# Patient Record
Sex: MALE | Race: WHITE | Employment: FULL TIME | URBAN - METROPOLITAN AREA
[De-identification: names, ages, dates, MRNs, and addresses within clinical notes are randomized per-mention and may not be internally consistent; named-entity substitution may affect disease eponyms.]

---

## 2017-04-26 ENCOUNTER — TELEPHONE (OUTPATIENT)
Dept: CARDIOLOGY CLINIC | Age: 52
End: 2017-04-26

## 2017-11-10 RX ORDER — AMOXICILLIN 500 MG/1
CAPSULE ORAL
Qty: 4 CAPSULE | Refills: 11 | Status: SHIPPED | OUTPATIENT
Start: 2017-11-10 | End: 2018-10-26

## 2018-09-27 ENCOUNTER — OFFICE VISIT (OUTPATIENT)
Dept: CARDIOLOGY CLINIC | Age: 53
End: 2018-09-27
Payer: COMMERCIAL

## 2018-09-27 ENCOUNTER — TELEPHONE (OUTPATIENT)
Dept: CARDIOLOGY CLINIC | Age: 53
End: 2018-09-27

## 2018-09-27 VITALS
WEIGHT: 281 LBS | HEART RATE: 80 BPM | DIASTOLIC BLOOD PRESSURE: 72 MMHG | HEIGHT: 74 IN | BODY MASS INDEX: 36.06 KG/M2 | OXYGEN SATURATION: 95 % | SYSTOLIC BLOOD PRESSURE: 124 MMHG

## 2018-09-27 DIAGNOSIS — I10 ESSENTIAL HYPERTENSION: ICD-10-CM

## 2018-09-27 DIAGNOSIS — E11.9 TYPE 2 DIABETES MELLITUS WITHOUT COMPLICATION, WITHOUT LONG-TERM CURRENT USE OF INSULIN (HCC): ICD-10-CM

## 2018-09-27 DIAGNOSIS — Z95.2 S/P AVR (AORTIC VALVE REPLACEMENT): Primary | ICD-10-CM

## 2018-09-27 DIAGNOSIS — E78.2 MIXED HYPERLIPIDEMIA: ICD-10-CM

## 2018-09-27 PROCEDURE — 99214 OFFICE O/P EST MOD 30 MIN: CPT | Performed by: INTERNAL MEDICINE

## 2018-09-27 RX ORDER — ASPIRIN 81 MG/1
81 TABLET ORAL DAILY
Qty: 30 TABLET | Refills: 3
Start: 2018-09-27 | End: 2020-11-18 | Stop reason: ALTCHOICE

## 2018-09-27 RX ORDER — ATORVASTATIN CALCIUM 10 MG/1
10 TABLET, FILM COATED ORAL DAILY
Qty: 30 TABLET | Refills: 5 | Status: SHIPPED | OUTPATIENT
Start: 2018-09-27 | End: 2019-04-11 | Stop reason: SDUPTHER

## 2018-09-27 NOTE — LETTER
INVOKANA 300 MG TABS tablet  10/7/15  Yes Historical Provider, MD   metFORMIN (GLUCOPHAGE) 1000 MG tablet  10/12/15  Yes Historical Provider, MD   ramipril (ALTACE) 5 MG capsule  10/11/15  Yes Historical Provider, MD   tamsulosin (FLOMAX) 0.4 MG capsule  10/11/15  Yes Historical Provider, MD   warfarin (COUMADIN) 5 MG tablet Take 5 mg by mouth Currently 15 mg qd   Yes Historical Provider, MD        Allergies:  Patient has no known allergies. Review of Systems:   · Constitutional: there has been no unanticipated weight loss. There's been no change in energy level, sleep pattern, or activity level. · Eyes: No visual changes or diplopia. No scleral icterus. · ENT: No Headaches, hearing loss or vertigo. No mouth sores or sore throat. · Cardiovascular: Reviewed in HPI  · Respiratory: No cough or wheezing, no sputum production. No hematemesis. · Gastrointestinal: No abdominal pain, appetite loss, blood in stools. No change in bowel or bladder habits. · Genitourinary: No dysuria, trouble voiding, or hematuria. · Musculoskeletal:  No gait disturbance, weakness or joint complaints. · Integumentary: No rash or pruritis. · Neurological: No headache, diplopia, change in muscle strength, numbness or tingling. No change in gait, balance, coordination, mood, affect, memory, mentation, behavior. · Psychiatric: No anxiety, no depression. · Endocrine: No malaise, fatigue or temperature intolerance. No excessive thirst, fluid intake, or urination. No tremor. · Hematologic/Lymphatic: No abnormal bruising or bleeding, blood clots or swollen lymph nodes. · Allergic/Immunologic: No nasal congestion or hives.     Physical Examination:    Vitals:    09/27/18 1012   BP: 124/72   Pulse: 80   SpO2: 95%        Constitutional and General Appearance: NAD   Respiratory:  · Normal excursion and expansion without use of accessory muscles  · Resp Auscultation: Normal breath sounds without dullness  Cardiovascular: · The apical impulses not displaced  · Heart tones are crisp and normal  · Cervical veins are not engorged  · The carotid upstroke is normal in amplitude and contour without delay or bruit  · Normal S1S2, No S3, No Murmur  · Peripheral pulses are symmetrical and full  · There is no clubbing, cyanosis of the extremities. · No edema  · Femoral Arteries: 2+ and equal  · Pedal Pulses: 2+ and equal   Abdomen:  · No masses or tenderness  · Liver/Spleen: No Abnormalities Noted  Neurological/Psychiatric:  · Alert and oriented in all spheres  · Moves all extremities well  · Exhibits normal gait balance and coordination  · No abnormalities of mood, affect, memory, mentation, or behavior are noted    Stress echo 11/2015  Baseline echocardiogram shows normal LV function with an ejection fraction   of 55% with no wall motion abnormalities. Following exercise there was   uniform augmentation of all segments with appropriate hyperdynamic response   to exercise. Assessment:      S/P AVR: 2005 - stable    Type 2 diabetes mellitus without complication: followed by PCP    Essential hypertension : stable     Chronic anticoagulation : on Coumadin, managing at home, INR consistently 2.5 - 3.5. stable        RBBB       PVC       Abnormal EKG    Plan:  Encourage daily exercise and healthy eating (low carb)  Continue current medication regimen  Recommend taking an 81 mg of Asprin daily due to multiple risk factors for CAD. Discussed R/B. Reviewed lipids from 4/2018. Recommend start low dose statin due to multiple risk factors for CAD. Discussed R/B. Follow up with me in 1-2 years      Jewels Blanco. Cherelle Miles M.D., [facility]              If you have questions, please do not hesitate to call me. I look forward to following Kayla Lizama along with you.     Sincerely,        Miguel Chou MD

## 2018-09-27 NOTE — PATIENT INSTRUCTIONS
Plan:  Encourage daily exercise and healthy eating (low carb)  Continue current medication regimen  Recommend taking an 81 mg of Asprin daily  Will review your labs from your PCP and will call you regarding medication  Follow up with me as needed

## 2018-09-27 NOTE — PROGRESS NOTES
[address]   Cardiac Followup    Referring Provider:  Enriqueta Guadarrama     Chief Complaint   Patient presents with    New Patient     last seen 2015    Cardiac Valve Problem        History of Present Illness:  F/U AVR (2005). Today he states that he feels well from a cardiac standpoint. Denies chest pain, shortness of breath, edema, dizziness, palpitations and syncope. States that he is exercising, by walking and riding a bike as well as a recumbent bike without difficulty. He states he has been traveling a lot due to work. He states he was diagnosed with a neurogenic bladder and is having to straight cath himself at times. He checks his PT/INR at home. BP normal at home. Past Medical History:   has a past medical history of Aortic valve disease; Diabetes ([address] Hyperlipidemia; Hypertension; and Neurogenic bladder. Surgical History:   has a past surgical history that includes Aortic valve replacement. Social History:   reports that he has never smoked. He has never used smokeless tobacco. He reports that he drinks alcohol. He reports that he does not use drugs. Family History:  family history includes Heart Disease in his father and mother. Home Medications:  Prior to Admission medications    Medication Sig Start Date End Date Taking? Authorizing Provider   amoxicillin (AMOXIL) 500 MG capsule 4 tablets one hour prior to dental work 11/10/17  Yes Jose Guadalupe Duran MD   INVOKANA 300 MG TABS tablet  10/7/15  Yes Historical Provider, MD   metFORMIN (GLUCOPHAGE) 1000 MG tablet  10/12/15  Yes Historical Provider, MD   ramipril (ALTACE) 5 MG capsule  10/11/15  Yes Historical Provider, MD   tamsulosin (FLOMAX) 0.4 MG capsule  10/11/15  Yes Historical Provider, MD   warfarin (COUMADIN) 5 MG tablet Take 5 mg by mouth Currently 15 mg qd   Yes Historical Provider, MD        Allergies:  Patient has no known allergies.      Review of Systems:   · Constitutional: there has been no unanticipated weight loss. There's been no change in energy level, sleep pattern, or activity level. · Eyes: No visual changes or diplopia. No scleral icterus. · ENT: No Headaches, hearing loss or vertigo. No mouth sores or sore throat. · Cardiovascular: Reviewed in HPI  · Respiratory: No cough or wheezing, no sputum production. No hematemesis. · Gastrointestinal: No abdominal pain, appetite loss, blood in stools. No change in bowel or bladder habits. · Genitourinary: No dysuria, trouble voiding, or hematuria. · Musculoskeletal:  No gait disturbance, weakness or joint complaints. · Integumentary: No rash or pruritis. · Neurological: No headache, diplopia, change in muscle strength, numbness or tingling. No change in gait, balance, coordination, mood, affect, memory, mentation, behavior. · Psychiatric: No anxiety, no depression. · Endocrine: No malaise, fatigue or temperature intolerance. No excessive thirst, fluid intake, or urination. No tremor. · Hematologic/Lymphatic: No abnormal bruising or bleeding, blood clots or swollen lymph nodes. · Allergic/Immunologic: No nasal congestion or hives. Physical Examination:    Vitals:    09/27/18 1012   BP: 124/72   Pulse: 80   SpO2: 95%        Constitutional and General Appearance: NAD   Respiratory:  · Normal excursion and expansion without use of accessory muscles  · Resp Auscultation: Normal breath sounds without dullness  Cardiovascular:  · The apical impulses not displaced  · Heart tones are crisp and normal  · Cervical veins are not engorged  · The carotid upstroke is normal in amplitude and contour without delay or bruit  · Normal S1S2, No S3, No Murmur  · Peripheral pulses are symmetrical and full  · There is no clubbing, cyanosis of the extremities.   · No edema  · Femoral Arteries: 2+ and equal  · Pedal Pulses: 2+ and equal   Abdomen:  · No masses or tenderness  · Liver/Spleen: No Abnormalities Noted  Neurological/Psychiatric:  · Alert and oriented in all

## 2018-10-26 ENCOUNTER — APPOINTMENT (OUTPATIENT)
Dept: CT IMAGING | Age: 53
End: 2018-10-26
Payer: COMMERCIAL

## 2018-10-26 ENCOUNTER — HOSPITAL ENCOUNTER (EMERGENCY)
Age: 53
Discharge: HOME | End: 2018-10-26
Attending: EMERGENCY MEDICINE
Payer: COMMERCIAL

## 2018-10-26 VITALS
HEIGHT: 74 IN | SYSTOLIC BLOOD PRESSURE: 111 MMHG | RESPIRATION RATE: 18 BRPM | WEIGHT: 270 LBS | HEART RATE: 78 BPM | DIASTOLIC BLOOD PRESSURE: 72 MMHG | BODY MASS INDEX: 34.65 KG/M2 | OXYGEN SATURATION: 98 % | TEMPERATURE: 99 F

## 2018-10-26 DIAGNOSIS — Z79.01 LONG TERM (CURRENT) USE OF ANTICOAGULANTS: ICD-10-CM

## 2018-10-26 DIAGNOSIS — S80.11XA HEMATOMA OF RIGHT LOWER EXTREMITY, INITIAL ENCOUNTER: Primary | ICD-10-CM

## 2018-10-26 LAB
A/G RATIO: 1.2 (ref 1.1–2.2)
ALBUMIN SERPL-MCNC: 3.6 G/DL (ref 3.4–5)
ALP BLD-CCNC: 61 U/L (ref 40–129)
ALT SERPL-CCNC: 21 U/L (ref 10–40)
ANION GAP SERPL CALCULATED.3IONS-SCNC: 12 MMOL/L (ref 3–16)
AST SERPL-CCNC: 18 U/L (ref 15–37)
BILIRUB SERPL-MCNC: 0.7 MG/DL (ref 0–1)
BUN BLDV-MCNC: 13 MG/DL (ref 7–20)
CALCIUM SERPL-MCNC: 9.1 MG/DL (ref 8.3–10.6)
CHLORIDE BLD-SCNC: 97 MMOL/L (ref 99–110)
CO2: 25 MMOL/L (ref 21–32)
CREAT SERPL-MCNC: 0.8 MG/DL (ref 0.9–1.3)
GFR AFRICAN AMERICAN: >60
GFR NON-AFRICAN AMERICAN: >60
GLOBULIN: 3 G/DL
GLUCOSE BLD-MCNC: 192 MG/DL (ref 70–99)
HCT VFR BLD CALC: 32.7 % (ref 40.5–52.5)
HEMOGLOBIN: 10.9 G/DL (ref 13.5–17.5)
INR BLD: 3.54 (ref 0.86–1.14)
LACTIC ACID: 1.8 MMOL/L (ref 0.4–2)
MCH RBC QN AUTO: 27.7 PG (ref 26–34)
MCHC RBC AUTO-ENTMCNC: 33.4 G/DL (ref 31–36)
MCV RBC AUTO: 82.8 FL (ref 80–100)
PDW BLD-RTO: 13.3 % (ref 12.4–15.4)
PLATELET # BLD: 317 K/UL (ref 135–450)
PMV BLD AUTO: 7.9 FL (ref 5–10.5)
POTASSIUM SERPL-SCNC: 3.8 MMOL/L (ref 3.5–5.1)
PROTHROMBIN TIME: 40.4 SEC (ref 9.8–13)
RBC # BLD: 3.95 M/UL (ref 4.2–5.9)
SODIUM BLD-SCNC: 134 MMOL/L (ref 136–145)
TOTAL PROTEIN: 6.6 G/DL (ref 6.4–8.2)
WBC # BLD: 9.7 K/UL (ref 4–11)

## 2018-10-26 PROCEDURE — 85610 PROTHROMBIN TIME: CPT

## 2018-10-26 PROCEDURE — 73701 CT LOWER EXTREMITY W/DYE: CPT

## 2018-10-26 PROCEDURE — 96360 HYDRATION IV INFUSION INIT: CPT

## 2018-10-26 PROCEDURE — 99284 EMERGENCY DEPT VISIT MOD MDM: CPT

## 2018-10-26 PROCEDURE — 6360000004 HC RX CONTRAST MEDICATION: Performed by: EMERGENCY MEDICINE

## 2018-10-26 PROCEDURE — 99284 EMERGENCY DEPT VISIT MOD MDM: CPT | Performed by: SURGERY

## 2018-10-26 PROCEDURE — 87040 BLOOD CULTURE FOR BACTERIA: CPT

## 2018-10-26 PROCEDURE — 85027 COMPLETE CBC AUTOMATED: CPT

## 2018-10-26 PROCEDURE — 96361 HYDRATE IV INFUSION ADD-ON: CPT

## 2018-10-26 PROCEDURE — 80053 COMPREHEN METABOLIC PANEL: CPT

## 2018-10-26 PROCEDURE — 2580000003 HC RX 258: Performed by: PHYSICIAN ASSISTANT

## 2018-10-26 PROCEDURE — 83605 ASSAY OF LACTIC ACID: CPT

## 2018-10-26 RX ORDER — 0.9 % SODIUM CHLORIDE 0.9 %
1000 INTRAVENOUS SOLUTION INTRAVENOUS ONCE
Status: COMPLETED | OUTPATIENT
Start: 2018-10-26 | End: 2018-10-26

## 2018-10-26 RX ADMIN — SODIUM CHLORIDE 1000 ML: 9 INJECTION, SOLUTION INTRAVENOUS at 11:08

## 2018-10-26 RX ADMIN — IOPAMIDOL 75 ML: 755 INJECTION, SOLUTION INTRAVENOUS at 12:30

## 2018-10-26 ASSESSMENT — PAIN DESCRIPTION - ORIENTATION: ORIENTATION: RIGHT

## 2018-10-26 ASSESSMENT — PAIN DESCRIPTION - LOCATION: LOCATION: LEG

## 2018-10-26 ASSESSMENT — PAIN DESCRIPTION - PAIN TYPE: TYPE: ACUTE PAIN

## 2018-10-26 ASSESSMENT — PAIN SCALES - GENERAL: PAINLEVEL_OUTOF10: 2

## 2018-10-26 NOTE — ED PROVIDER NOTES
0.0 - 1.0 mg/dL    Alkaline Phosphatase 61 40 - 129 U/L    ALT 21 10 - 40 U/L    AST 18 15 - 37 U/L    Globulin 3.0 g/dL   Protime-INR   Result Value Ref Range    Protime 40.4 (H) 9.8 - 13.0 sec    INR 3.54 (H) 0.86 - 1.14   Lactic acid, plasma   Result Value Ref Range    Lactic Acid 1.8 0.4 - 2.0 mmol/L          Magaly Mena MD  10/26/18 5946
BUN 13 7 - 20 mg/dL    CREATININE 0.8 (L) 0.9 - 1.3 mg/dL    GFR Non-African American >60 >60    GFR African American >60 >60    Calcium 9.1 8.3 - 10.6 mg/dL    Total Protein 6.6 6.4 - 8.2 g/dL    Alb 3.6 3.4 - 5.0 g/dL    Albumin/Globulin Ratio 1.2 1.1 - 2.2    Total Bilirubin 0.7 0.0 - 1.0 mg/dL    Alkaline Phosphatase 61 40 - 129 U/L    ALT 21 10 - 40 U/L    AST 18 15 - 37 U/L    Globulin 3.0 g/dL   Protime-INR   Result Value Ref Range    Protime 40.4 (H) 9.8 - 13.0 sec    INR 3.54 (H) 0.86 - 1.14   Lactic acid, plasma   Result Value Ref Range    Lactic Acid 1.8 0.4 - 2.0 mmol/L     I estimate there is LOW risk for COMPARTMENT SYNDROME, DEEP VENOUS THROMBOSIS, SEPTIC ARTHRITIS, TENDON OR NEUROVASCULAR INJURY, thus I consider the discharge disposition reasonable. Charisma Mckeon and I have discussed the diagnosis and risks, and we agree with discharging home to follow-up with their primary doctor or the referral orthopedist. We also discussed returning to the Emergency Department immediately if new or worsening symptoms occur. We have discussed the symptoms which are most concerning (e.g., changing or worsening pain, numbness, weakness) that necessitate immediate return. Final Impression  1. Hematoma of right lower extremity, initial encounter    2. Long term (current) use of anticoagulants      Blood pressure 111/72, pulse 78, temperature 99 °F (37.2 °C), resp. rate 18, height 6' 2\" (1.88 m), weight 270 lb (122.5 kg), SpO2 98 %. DISPOSITION  Patient was discharged to home in good condition.           [place], [place]  10/27/18 6429

## 2018-10-29 ENCOUNTER — HOSPITAL ENCOUNTER (OUTPATIENT)
Age: 53
Discharge: HOME | End: 2018-10-29
Payer: COMMERCIAL

## 2018-10-29 ENCOUNTER — OFFICE VISIT (OUTPATIENT)
Dept: SURGERY | Age: 53
End: 2018-10-29
Payer: COMMERCIAL

## 2018-10-29 VITALS
HEART RATE: 88 BPM | BODY MASS INDEX: 36.65 KG/M2 | HEIGHT: 74 IN | DIASTOLIC BLOOD PRESSURE: 69 MMHG | SYSTOLIC BLOOD PRESSURE: 118 MMHG | WEIGHT: 285.6 LBS

## 2018-10-29 DIAGNOSIS — Z95.2 S/P AVR (AORTIC VALVE REPLACEMENT): ICD-10-CM

## 2018-10-29 DIAGNOSIS — S80.11XD HEMATOMA OF RIGHT LOWER EXTREMITY, SUBSEQUENT ENCOUNTER: Primary | ICD-10-CM

## 2018-10-29 DIAGNOSIS — Z79.01 CHRONIC ANTICOAGULATION: ICD-10-CM

## 2018-10-29 DIAGNOSIS — S80.11XD HEMATOMA OF RIGHT LOWER EXTREMITY, SUBSEQUENT ENCOUNTER: ICD-10-CM

## 2018-10-29 DIAGNOSIS — Z95.2 S/P AVR (AORTIC VALVE REPLACEMENT): Primary | ICD-10-CM

## 2018-10-29 LAB
BASOPHILS ABSOLUTE: 0.1 K/UL (ref 0–0.2)
BASOPHILS RELATIVE PERCENT: 0.9 %
EOSINOPHILS ABSOLUTE: 0.4 K/UL (ref 0–0.6)
EOSINOPHILS RELATIVE PERCENT: 4.3 %
HCT VFR BLD CALC: 32.4 % (ref 40.5–52.5)
HEMOGLOBIN: 10.9 G/DL (ref 13.5–17.5)
INR BLD: 1.28 (ref 0.86–1.14)
LYMPHOCYTES ABSOLUTE: 1.7 K/UL (ref 1–5.1)
LYMPHOCYTES RELATIVE PERCENT: 19.1 %
MCH RBC QN AUTO: 27.8 PG (ref 26–34)
MCHC RBC AUTO-ENTMCNC: 33.7 G/DL (ref 31–36)
MCV RBC AUTO: 82.5 FL (ref 80–100)
MONOCYTES ABSOLUTE: 0.7 K/UL (ref 0–1.3)
MONOCYTES RELATIVE PERCENT: 8.2 %
NEUTROPHILS ABSOLUTE: 6.2 K/UL (ref 1.7–7.7)
NEUTROPHILS RELATIVE PERCENT: 67.5 %
PDW BLD-RTO: 13.3 % (ref 12.4–15.4)
PLATELET # BLD: 429 K/UL (ref 135–450)
PMV BLD AUTO: 8.5 FL (ref 5–10.5)
PROTHROMBIN TIME: 14.6 SEC (ref 9.8–13)
RBC # BLD: 3.93 M/UL (ref 4.2–5.9)
WBC # BLD: 9.1 K/UL (ref 4–11)

## 2018-10-29 PROCEDURE — 85610 PROTHROMBIN TIME: CPT

## 2018-10-29 PROCEDURE — G8484 FLU IMMUNIZE NO ADMIN: HCPCS | Performed by: SURGERY

## 2018-10-29 PROCEDURE — 3017F COLORECTAL CA SCREEN DOC REV: CPT | Performed by: SURGERY

## 2018-10-29 PROCEDURE — 99212 OFFICE O/P EST SF 10 MIN: CPT | Performed by: SURGERY

## 2018-10-29 PROCEDURE — 1036F TOBACCO NON-USER: CPT | Performed by: SURGERY

## 2018-10-29 PROCEDURE — G8427 DOCREV CUR MEDS BY ELIG CLIN: HCPCS | Performed by: SURGERY

## 2018-10-29 PROCEDURE — 36415 COLL VENOUS BLD VENIPUNCTURE: CPT

## 2018-10-29 PROCEDURE — 85025 COMPLETE CBC W/AUTO DIFF WBC: CPT

## 2018-10-29 PROCEDURE — G8417 CALC BMI ABV UP PARAM F/U: HCPCS | Performed by: SURGERY

## 2018-10-29 NOTE — PROGRESS NOTES
infection, no evidence of active bleeding. 2. CBC to check H&H. If normal, plan to re-start Coumadin tomorrow. 3.  If he were to develop increasing pain or erythema then should return to ED.       Colleen Chou, OMS-III    Patient seen and agree with above. No erythema of upper leg. Has continued ecchymosis but no change in size of hematoma. No paresthesias of weakness. Has swelling in lower leg. Has been wearing compression sleeve over thigh only. Currently no signs of infection. Repeat CBC to ensure hgb stable. Discussed thigh high ania hose for continuous compression from foot upwards.   Ok to restart coumadin from my standpoint    Aramis Zuluaga MD

## 2018-10-30 ENCOUNTER — TELEPHONE (OUTPATIENT)
Dept: CARDIOLOGY CLINIC | Age: 53
End: 2018-10-30

## 2018-10-30 ENCOUNTER — TELEPHONE (OUTPATIENT)
Dept: SURGERY | Age: 53
End: 2018-10-30

## 2018-10-31 LAB
BLOOD CULTURE, ROUTINE: NORMAL
CULTURE, BLOOD 2: NORMAL

## 2018-11-13 DIAGNOSIS — S80.11XD HEMATOMA OF RIGHT LOWER EXTREMITY, SUBSEQUENT ENCOUNTER: Primary | ICD-10-CM

## 2018-11-14 ENCOUNTER — TELEPHONE (OUTPATIENT)
Dept: CARDIOLOGY CLINIC | Age: 53
End: 2018-11-14

## 2018-11-14 ENCOUNTER — HOSPITAL ENCOUNTER (OUTPATIENT)
Dept: VASCULAR LAB | Age: 53
Discharge: HOME | End: 2018-11-14
Payer: COMMERCIAL

## 2018-11-14 DIAGNOSIS — M79.89 RIGHT LEG SWELLING: ICD-10-CM

## 2018-11-14 DIAGNOSIS — M79.604 RIGHT LEG PAIN: ICD-10-CM

## 2018-11-14 DIAGNOSIS — M79.604 RIGHT LEG PAIN: Primary | ICD-10-CM

## 2018-11-14 PROCEDURE — 93971 EXTREMITY STUDY: CPT

## 2019-02-06 ENCOUNTER — TELEPHONE (OUTPATIENT)
Dept: CARDIOLOGY CLINIC | Age: 54
End: 2019-02-06

## 2019-04-12 RX ORDER — ATORVASTATIN CALCIUM 10 MG/1
TABLET, FILM COATED ORAL
Qty: 90 TABLET | Refills: 1 | Status: SHIPPED | OUTPATIENT
Start: 2019-04-12 | End: 2019-10-16 | Stop reason: SDUPTHER

## 2019-10-16 ENCOUNTER — TELEPHONE (OUTPATIENT)
Dept: CARDIOLOGY CLINIC | Age: 54
End: 2019-10-16

## 2019-10-16 RX ORDER — ATORVASTATIN CALCIUM 10 MG/1
TABLET, FILM COATED ORAL
Qty: 30 TABLET | Refills: 0 | Status: SHIPPED | OUTPATIENT
Start: 2019-10-16 | End: 2019-11-05 | Stop reason: SDUPTHER

## 2019-11-05 ENCOUNTER — OFFICE VISIT (OUTPATIENT)
Dept: CARDIOLOGY CLINIC | Age: 54
End: 2019-11-05
Payer: COMMERCIAL

## 2019-11-05 VITALS
SYSTOLIC BLOOD PRESSURE: 108 MMHG | DIASTOLIC BLOOD PRESSURE: 80 MMHG | HEART RATE: 69 BPM | HEIGHT: 74 IN | OXYGEN SATURATION: 98 % | WEIGHT: 279 LBS | BODY MASS INDEX: 35.81 KG/M2

## 2019-11-05 DIAGNOSIS — R07.2 PRECORDIAL PAIN: ICD-10-CM

## 2019-11-05 DIAGNOSIS — Z95.2 S/P AVR (AORTIC VALVE REPLACEMENT): Primary | ICD-10-CM

## 2019-11-05 DIAGNOSIS — I10 ESSENTIAL HYPERTENSION: ICD-10-CM

## 2019-11-05 PROCEDURE — G8417 CALC BMI ABV UP PARAM F/U: HCPCS | Performed by: INTERNAL MEDICINE

## 2019-11-05 PROCEDURE — 99214 OFFICE O/P EST MOD 30 MIN: CPT | Performed by: INTERNAL MEDICINE

## 2019-11-05 PROCEDURE — 1036F TOBACCO NON-USER: CPT | Performed by: INTERNAL MEDICINE

## 2019-11-05 PROCEDURE — 3017F COLORECTAL CA SCREEN DOC REV: CPT | Performed by: INTERNAL MEDICINE

## 2019-11-05 PROCEDURE — G8427 DOCREV CUR MEDS BY ELIG CLIN: HCPCS | Performed by: INTERNAL MEDICINE

## 2019-11-05 PROCEDURE — G8484 FLU IMMUNIZE NO ADMIN: HCPCS | Performed by: INTERNAL MEDICINE

## 2019-11-05 RX ORDER — RAMIPRIL 5 MG/1
5 CAPSULE ORAL DAILY
Qty: 90 CAPSULE | Refills: 3 | Status: SHIPPED | OUTPATIENT
Start: 2019-11-05 | End: 2020-11-24

## 2019-11-05 RX ORDER — ATORVASTATIN CALCIUM 10 MG/1
TABLET, FILM COATED ORAL
Qty: 90 TABLET | Refills: 3 | Status: SHIPPED | OUTPATIENT
Start: 2019-11-05 | End: 2020-11-24

## 2019-11-05 RX ORDER — WARFARIN SODIUM 5 MG/1
TABLET ORAL
Qty: 30 TABLET | Refills: 11 | Status: SHIPPED | OUTPATIENT
Start: 2019-11-05

## 2020-11-17 NOTE — PROGRESS NOTES
2020    1 Year Follow Up; Hypertension; Other (S/P AVR); and Shortness of Breath       TELEHEALTH EVALUATION -- Audio/Visual ([facility])    HPI: Vicente Yarbrough is a 63 y.o. male who is having a virtual visit today for follow up for a history of hypertension, S/p AVR in , Right BBB, diabetes, and PVC's. Today he states he has been feeling well from a cardiac standpoint. He is currently headed to [place]. He states he had a physical with his PCP on 2020. The next day he started to feel sick with a fever. He tested positive for COVID a few days later. Another symptoms he had was tingling on both sides oh his tongue. He lost his taste and smell as well. He has SOB with a dry cough. Worse since covid. SOB occurs with activity, resolves when he stops to rest. It is relatively mild. Happens daily. No chest pain associated with the SOB or through his illness. He has fatigue that is overall starting to decrease. His brother recently had bypass surgery which concerns him. Patient currently denies any weight gain, edema, palpitations, chest pain, dizziness, and syncope. Patient Self caths several times a day. Vicente Yarbrough (:  1965) has requested an audio/video evaluation for the following concern(s): Follow up for AVR, HTN, PVC's and SOB. [x] Medications and dosages reviewed. ROS:  [x]Full ROS obtained and negative except as mentioned in HPI    Prior to Visit Medications    Medication Sig Taking?  Authorizing Provider   atorvastatin (LIPITOR) 10 MG tablet TAKE 1 TABLET BY MOUTH ONE TIME A DAY Yes Cristi Rosas MD   ramipril (ALTACE) 5 MG capsule Take 1 capsule by mouth daily Yes Cristi Rosas MD   warfarin (COUMADIN) 5 MG tablet Currently 15 mg qd Yes Cristi Rosas MD   INVOKANA 300 MG TABS tablet  Yes Historical Provider, MD   metFORMIN (GLUCOPHAGE) 1000 MG tablet  Yes Historical Provider, MD   tamsulosin (FLOMAX) 0.4 MG capsule Yes Historical Provider, MD   enoxaparin (LOVENOX) 30 MG/0.3ML injection Inject one syringe every 12 hours  Patient not taking: Reported on 11/5/2019  Yazan Anderson MD   enoxaparin (LOVENOX) 100 MG/ML injection Inject 1 syringe every 12 hours  Patient not taking: Reported on 11/5/2019  Yazan Anderson MD       Social History     Tobacco Use    Smoking status: Never Smoker    Smokeless tobacco: Never Used   Substance Use Topics    Alcohol use: Yes     Comment: occ    Drug use: No          No Known Allergies,   Past Medical History:   Diagnosis Date    Aortic valve disease     Diabetes ([address]     Hyperlipidemia     Hypertension     Neurogenic bladder    ,   Past Surgical History:   Procedure Laterality Date    AORTIC VALVE REPLACEMENT     ,   Social History     Tobacco Use    Smoking status: Never Smoker    Smokeless tobacco: Never Used   Substance Use Topics    Alcohol use: Yes     Comment: occ    Drug use: No   ,   Family History   Problem Relation Age of Onset    Heart Disease Mother     Heart Disease Father    ,   There is no immunization history on file for this patient.,   Health Maintenance   Topic Date Due    Hepatitis C screen  1965    Pneumococcal 0-64 years Vaccine (1 of 1 - PPSV23) 04/09/1970    Diabetic foot exam  04/09/1974    A1C test (Diabetic or Prediabetic)  04/09/1974    Diabetic retinal exam  04/09/1974    Lipid screen  04/09/1974    HIV screen  04/09/1979    Diabetic microalbuminuria test  04/09/1982    Hepatitis B vaccine (1 of 3 - Risk 3-dose series) 04/09/1983    DTaP/Tdap/Td vaccine (1 - Tdap) 04/09/1983    Shingles Vaccine (1 of 2) 04/09/2014    Colon cancer screen colonoscopy  04/09/2014    Potassium monitoring  10/26/2019    Creatinine monitoring  10/26/2019    Flu vaccine (1) 09/01/2020    Hepatitis A vaccine  Aged Out    Hib vaccine  Aged Out    Meningococcal (ACWY) vaccine  Aged Out       PHYSICAL EXAMINATION:  [ INSTRUCTIONS:  \"[x]\" Indicates a positive item  \"[]\" Indicates a negative item  -- DELETE ALL ITEMS NOT EXAMINED]  Vital Signs: (As obtained by patient/caregiver or practitioner observation)    Blood pressure- vitals not taken today  Heart rate-    Respiratory rate-    Temperature-  Pulse oximetry-     Constitutional: [x] Appears well-developed and well-nourished [] No apparent distress      [] Abnormal-   Mental status  [x] Alert and awake  [] Oriented to person/place/time []Able to follow commands      Eyes:  EOM    [x]  Normal  [] Abnormal-  Sclera  [x]  Normal  [] Abnormal -         Discharge []  None visible  [] Abnormal -    HENT:   [x] Normocephalic, atraumatic. [] Abnormal   [] Mouth/Throat: Mucous membranes are moist.     External Ears [x] Normal  [] Abnormal-     Neck: [x] No visualized mass     Pulmonary/Chest: [x] Respiratory effort normal.  [] No visualized signs of difficulty breathing or respiratory distress        [] Abnormal-      Musculoskeletal:   [x] Normal gait with no signs of ataxia         [x] Normal range of motion of neck        [] Abnormal-       Neurological:        [x] No Facial Asymmetry (Cranial nerve 7 motor function) (limited exam to video visit)          [x] No gaze palsy        [] Abnormal-         Skin:        [x] No significant exanthematous lesions or discoloration noted on facial skin         [] Abnormal-            Psychiatric:       [x] Normal Affect [] No Hallucinations        [] Abnormal-     Other pertinent observable physical exam findings-       Stress echo 11/2015  Baseline echocardiogram shows normal LV function with an ejection fraction   of 55% with no wall motion abnormalities. Following exercise there was   uniform augmentation of all segments with appropriate hyperdynamic response   to exercise.     ASSESSMENT:  CARPENTER - possible cardiac  S/P AVR in 2005- stable   Chronic AC- coumadin, managing at home, INR consistently 2.5 - 3.5. stable  Right BBB  Hypertension - stable   PVC's   Abnormal --Lauren Gonzalez RN on 11/18/2020 at 2:04 PM    An electronic signature was used to authenticate this note.

## 2020-11-18 ENCOUNTER — VIRTUAL VISIT (OUTPATIENT)
Dept: CARDIOLOGY CLINIC | Age: 55
End: 2020-11-18
Payer: COMMERCIAL

## 2020-11-18 PROBLEM — I49.3 PVC'S (PREMATURE VENTRICULAR CONTRACTIONS): Status: ACTIVE | Noted: 2020-11-18

## 2020-11-18 PROBLEM — R06.02 SOB (SHORTNESS OF BREATH): Status: ACTIVE | Noted: 2020-11-18

## 2020-11-18 PROCEDURE — 3017F COLORECTAL CA SCREEN DOC REV: CPT | Performed by: INTERNAL MEDICINE

## 2020-11-18 PROCEDURE — 99214 OFFICE O/P EST MOD 30 MIN: CPT | Performed by: INTERNAL MEDICINE

## 2020-11-18 PROCEDURE — G8427 DOCREV CUR MEDS BY ELIG CLIN: HCPCS | Performed by: INTERNAL MEDICINE

## 2020-11-18 NOTE — PATIENT INSTRUCTIONS
Plan:   Stress echocardiogram   CT calcium score   Will consider restarting Aspirin after getting results of CT calcium score   Continue current medications   Check blood pressure at home weekly  Regular exercise and following a healthy diet encouraged   Follow up with me in 1 year

## 2020-11-18 NOTE — LETTER
2020    1 Year Follow Up; Hypertension; Other (S/P AVR); and Shortness of Breath       TELEHEALTH EVALUATION -- Audio/Visual (During [facility]-97 public health emergency)    HPI: Usman Kent is a 63 y.o. male who is having a virtual visit today for follow up for a history of hypertension, S/p AVR in , Right BBB, diabetes, and PVC's. Today he states he has been feeling well from a cardiac standpoint. He is currently headed to [place]. He states he had a physical with his PCP on 2020. The next day he started to feel sick with a fever. He tested positive for COVID a few days later. Another symptoms he had was tingling on both sides oh his tongue. He lost his taste and smell as well. He has SOB with a dry cough. Worse since covid. SOB occurs with activity, resolves when he stops to rest. It is relatively mild. Happens daily. No chest pain associated with the SOB or through his illness. He has fatigue that is overall starting to decrease. His brother recently had bypass surgery which concerns him. Patient currently denies any weight gain, edema, palpitations, chest pain, dizziness, and syncope. Patient Self caths several times a day. Usman Knet (:  1965) has requested an audio/video evaluation for the following concern(s): Follow up for AVR, HTN, PVC's and SOB. [x] Medications and dosages reviewed. ROS:  [x]Full ROS obtained and negative except as mentioned in HPI    Prior to Visit Medications    Medication Sig Taking?  Authorizing Provider   atorvastatin (LIPITOR) 10 MG tablet TAKE 1 TABLET BY MOUTH ONE TIME A DAY Yes Alexandra Prince MD   ramipril (ALTACE) 5 MG capsule Take 1 capsule by mouth daily Yes Alexandra Prince MD   warfarin (COUMADIN) 5 MG tablet Currently 15 mg qd Yes Alexandra Prince MD   INVOKANA 300 MG TABS tablet  Yes Historical Provider, MD   metFORMIN (GLUCOPHAGE) 1000 MG tablet  Yes Historical Provider, MD PHYSICAL EXAMINATION:  [ INSTRUCTIONS:  \"[x]\" Indicates a positive item  \"[]\" Indicates a negative item  -- DELETE ALL ITEMS NOT EXAMINED]  Vital Signs: (As obtained by patient/caregiver or practitioner observation)    Blood pressure- vitals not taken today  Heart rate-    Respiratory rate-    Temperature-  Pulse oximetry-     Constitutional: [x] Appears well-developed and well-nourished [] No apparent distress      [] Abnormal-   Mental status  [x] Alert and awake  [] Oriented to person/place/time []Able to follow commands      Eyes:  EOM    [x]  Normal  [] Abnormal-  Sclera  [x]  Normal  [] Abnormal -         Discharge []  None visible  [] Abnormal -    HENT:   [x] Normocephalic, atraumatic. [] Abnormal   [] Mouth/Throat: Mucous membranes are moist.     External Ears [x] Normal  [] Abnormal-     Neck: [x] No visualized mass     Pulmonary/Chest: [x] Respiratory effort normal.  [] No visualized signs of difficulty breathing or respiratory distress        [] Abnormal-      Musculoskeletal:   [x] Normal gait with no signs of ataxia         [x] Normal range of motion of neck        [] Abnormal-       Neurological:        [x] No Facial Asymmetry (Cranial nerve 7 motor function) (limited exam to video visit)          [x] No gaze palsy        [] Abnormal-         Skin:        [x] No significant exanthematous lesions or discoloration noted on facial skin         [] Abnormal-            Psychiatric:       [x] Normal Affect [] No Hallucinations        [] Abnormal-     Other pertinent observable physical exam findings-       Stress echo 11/2015  Baseline echocardiogram shows normal LV function with an ejection fraction   of 55% with no wall motion abnormalities. Following exercise there was   uniform augmentation of all segments with appropriate hyperdynamic response   to exercise.     ASSESSMENT:  CARPENTER - possible cardiac  S/P AVR in 2005- stable   Chronic AC- coumadin, managing at home, INR consistently 2.5 - 3.5. stable Right BBB  Hypertension - stable   PVC's   Abnormal EKG  COVID positive 10/2020  Family history of heart diease   Self catheterization bladder several times a day     Plan:   Stress echocardiogram   CT calcium score   Will consider restarting Aspirin after getting results of CT calcium score   Continue current medications   Check blood pressure at home weekly  Regular exercise and following a healthy diet encouraged   Follow up with me in 1 year           Charmayne Nones is a 63 y.o. male being evaluated by a Virtual Visit (video visit) encounter to address concerns as mentioned above. A caregiver was present when appropriate. Due to this being a TeleHealth encounter ([facility]), evaluation of the following organ systems was limited: Vitals/Constitutional/EENT/Resp/CV/GI//MS/Neuro/Skin/Heme-Lymph-Imm. Pursuant to the emergency declaration under the [address] [place] [address], [address] [place] [address] authority and the [employer] Act, this Virtual Visit was conducted with patient's (and/or legal guardian's) consent, to reduce the patient's risk of exposure to COVID-19 and provide necessary medical care. The patient (and/or legal guardian) has also been advised to contact this office for worsening conditions or problems, and seek emergency medical treatment and/or call 911 if deemed necessary. Scribe's attestation: This note was scribed in the presence of Dr. Kosta Mata M.D. By Nakia Wilburn RN     The scribes documentation has been prepared under my direction and personally reviewed by me in its entirety. I confirm that the note above accurately reflects all work, treatment, procedures, and medical decision making performed by me. Dr. Kosta Mata MD    Services were provided through a video synchronous discussion virtually to substitute for in-person clinic visit. I am seeing the patient today from my office and the patient from their home. --João Piña RN on 11/18/2020 at 2:04 PM    An electronic signature was used to authenticate this note.

## 2020-11-24 RX ORDER — RAMIPRIL 5 MG/1
CAPSULE ORAL
Qty: 90 CAPSULE | Refills: 3 | Status: SHIPPED | OUTPATIENT
Start: 2020-11-24 | End: 2021-11-29

## 2020-11-24 RX ORDER — ATORVASTATIN CALCIUM 10 MG/1
TABLET, FILM COATED ORAL
Qty: 90 TABLET | Refills: 3 | Status: SHIPPED | OUTPATIENT
Start: 2020-11-24 | End: 2021-11-29

## 2021-02-25 ENCOUNTER — IMMUNIZATION (OUTPATIENT)
Dept: PRIMARY CARE CLINIC | Age: 56
End: 2021-02-25
Payer: COMMERCIAL

## 2021-02-25 PROCEDURE — 0001A COVID-19, PFIZER VACCINE 30MCG/0.3ML DOSE: CPT | Performed by: FAMILY MEDICINE

## 2021-02-25 PROCEDURE — 91300 COVID-19, PFIZER VACCINE 30MCG/0.3ML DOSE: CPT | Performed by: FAMILY MEDICINE

## 2021-03-18 ENCOUNTER — IMMUNIZATION (OUTPATIENT)
Dept: PRIMARY CARE CLINIC | Age: 56
End: 2021-03-18
Payer: COMMERCIAL

## 2021-03-18 PROCEDURE — 91300 COVID-19, PFIZER VACCINE 30MCG/0.3ML DOSE: CPT | Performed by: FAMILY MEDICINE

## 2021-03-18 PROCEDURE — 0002A PR IMM ADMN SARSCOV2 30MCG/0.3ML DIL RECON 2ND DOSE: CPT | Performed by: FAMILY MEDICINE

## 2021-11-29 RX ORDER — RAMIPRIL 5 MG/1
CAPSULE ORAL
Qty: 90 CAPSULE | Refills: 0 | Status: SHIPPED | OUTPATIENT
Start: 2021-11-29 | End: 2022-03-01

## 2021-11-29 RX ORDER — ATORVASTATIN CALCIUM 10 MG/1
TABLET, FILM COATED ORAL
Qty: 90 TABLET | Refills: 0 | Status: SHIPPED | OUTPATIENT
Start: 2021-11-29 | End: 2022-03-01

## 2022-03-01 ENCOUNTER — TELEPHONE (OUTPATIENT)
Dept: CARDIOLOGY CLINIC | Age: 57
End: 2022-03-01

## 2022-03-01 RX ORDER — ATORVASTATIN CALCIUM 10 MG/1
TABLET, FILM COATED ORAL
Qty: 90 TABLET | Refills: 3 | Status: SHIPPED | OUTPATIENT
Start: 2022-03-01

## 2022-03-01 RX ORDER — RAMIPRIL 5 MG/1
CAPSULE ORAL
Qty: 90 CAPSULE | Refills: 3 | Status: SHIPPED | OUTPATIENT
Start: 2022-03-01

## 2022-03-01 NOTE — TELEPHONE ENCOUNTER
11/18/2020 [facility]    Had 12/3/2021 appt was cancelled, no upcoming appt. Will have the  contact pt.

## 2022-03-01 NOTE — TELEPHONE ENCOUNTER
Please contact pt for yearly appt/med refills appt. If the office can not get a hold of the patient for an appt please mail a letter to have them call and schedule. Thanks.

## 2022-03-14 ENCOUNTER — OFFICE VISIT (OUTPATIENT)
Dept: CARDIOLOGY CLINIC | Age: 57
End: 2022-03-14
Payer: COMMERCIAL

## 2022-03-14 VITALS
OXYGEN SATURATION: 95 % | HEART RATE: 81 BPM | HEIGHT: 74 IN | BODY MASS INDEX: 35.94 KG/M2 | DIASTOLIC BLOOD PRESSURE: 72 MMHG | WEIGHT: 280 LBS | SYSTOLIC BLOOD PRESSURE: 110 MMHG

## 2022-03-14 DIAGNOSIS — I10 ESSENTIAL HYPERTENSION: ICD-10-CM

## 2022-03-14 DIAGNOSIS — Z95.2 S/P AVR (AORTIC VALVE REPLACEMENT): Primary | ICD-10-CM

## 2022-03-14 DIAGNOSIS — R06.02 SOB (SHORTNESS OF BREATH): ICD-10-CM

## 2022-03-14 DIAGNOSIS — I49.3 PVC'S (PREMATURE VENTRICULAR CONTRACTIONS): ICD-10-CM

## 2022-03-14 DIAGNOSIS — R94.31 ABNORMAL EKG: ICD-10-CM

## 2022-03-14 DIAGNOSIS — E78.2 MIXED HYPERLIPIDEMIA: ICD-10-CM

## 2022-03-14 PROCEDURE — 1036F TOBACCO NON-USER: CPT | Performed by: INTERNAL MEDICINE

## 2022-03-14 PROCEDURE — 3017F COLORECTAL CA SCREEN DOC REV: CPT | Performed by: INTERNAL MEDICINE

## 2022-03-14 PROCEDURE — G8427 DOCREV CUR MEDS BY ELIG CLIN: HCPCS | Performed by: INTERNAL MEDICINE

## 2022-03-14 PROCEDURE — G8484 FLU IMMUNIZE NO ADMIN: HCPCS | Performed by: INTERNAL MEDICINE

## 2022-03-14 PROCEDURE — 99214 OFFICE O/P EST MOD 30 MIN: CPT | Performed by: INTERNAL MEDICINE

## 2022-03-14 PROCEDURE — G8417 CALC BMI ABV UP PARAM F/U: HCPCS | Performed by: INTERNAL MEDICINE

## 2022-03-14 NOTE — PATIENT INSTRUCTIONS
Plan:   CT calcium score as a screening tool   If CT calcium score comes back at 0 or low will just ordered a echocardiogram. If score is elevated then will ordered a stress echocardiogram   Stop Ramipril for one month and see if cough resolves. If you do not see a change in cough after being off medication for a month then restart it. Call the office and let us know. Cardiac medications reviewed including indications and pertinent side effects    Check blood pressure and heart rate at home a few times per week- keep a log with dates and times and bring to office visit   Regular exercise and following a healthy diet encouraged   Follow up with me in 1 year   Will call PCP office to obtain lab work results    Your provider has ordered testing for further evaluation. An order/prescription has been included in your paper work.  To schedule outpatient testing, contact Central Scheduling by calling [address] [place] [address] ([phone]).

## 2022-03-14 NOTE — PROGRESS NOTES
[address]   Cardiac Followup    Referring Provider:  Esmer Hankins     Chief Complaint   Patient presents with    1 Year Follow Up    Hypertension    Other     S/P AVR      Aubrey Francis   1965      History of Present Illness:    Aubrey Francis is a 61 y.o. male who is here today for follow up for a past medical history of hypertension, S/p AVR in 2005, Right BBB, diabetes, and PVC's. He has had both his COVID vaccines. Today he states he has been feeling well overall. At his PCP office this past November his blood sugar was found to be elevated. Diabetes medication adjusted at this time. He states he walks in the treadmill 7 days per week and also uses a stationary bike. No issues with chest pain or SOB. He has issues with a cough, clears his throat often. He thinks this is related to drainage. He has been taking Ramipril for the past 17 years. Gets mild CARPENTER only w/ very heavy exertion. Patient currently denies any weight gain, edema, palpitations, chest pain, shortness of breath, dizziness, and syncope. Past Medical History:   has a past medical history of Aortic valve disease, Diabetes ([address] Hyperlipidemia, Hypertension, and Neurogenic bladder. Surgical History:   has a past surgical history that includes Aortic valve replacement. Social History:   reports that he has never smoked. He has never used smokeless tobacco. He reports current alcohol use. He reports that he does not use drugs. Family History:  family history includes Heart Disease in his father and mother. Home Medications:  Prior to Admission medications    Medication Sig Start Date End Date Taking?  Authorizing Provider   Empagliflozin (JARDIANCE PO) Take by mouth daily   Yes Historical Provider, MD   atorvastatin (LIPITOR) 10 MG tablet TAKE 1 TABLET BY MOUTH EVERY DAY 3/1/22  Yes Liss Elaine MD   ramipril (ALTACE) 5 MG capsule TAKE 1 CAPSULE BY MOUTH EVERY DAY 3/1/22  Yes Liss Elaine MD warfarin (COUMADIN) 5 MG tablet Currently 15 mg qd 11/5/19  Yes Damian Serna MD   metFORMIN (GLUCOPHAGE) 500 MG tablet 2 times daily (with meals)  10/12/15  Yes Historical Provider, MD   tamsulosin (FLOMAX) 0.4 MG capsule  10/11/15  Yes Historical Provider, MD   enoxaparin (LOVENOX) 30 MG/0.3ML injection Inject one syringe every 12 hours  Patient not taking: Reported on 11/5/2019 2/11/19   Migdalia Davidson MD   enoxaparin (LOVENOX) 100 MG/ML injection Inject 1 syringe every 12 hours  Patient not taking: Reported on 11/5/2019 2/11/19   Migdalia Davidson MD   INVOKANA 300 MG TABS tablet  10/7/15   Historical Provider, MD        Allergies:  Patient has no known allergies. Review of Systems:   · Constitutional: there has been no unanticipated weight loss. There's been no change in energy level, sleep pattern, or activity level. · Eyes: No visual changes or diplopia. No scleral icterus. · ENT: No Headaches, hearing loss or vertigo. No mouth sores or sore throat. · Cardiovascular: Reviewed in HPI  · Respiratory: No cough or wheezing, no sputum production. No hematemesis. · Gastrointestinal: No abdominal pain, appetite loss, blood in stools. No change in bowel or bladder habits. · Genitourinary: No dysuria, trouble voiding, or hematuria. · Musculoskeletal:  No gait disturbance, weakness or joint complaints. · Integumentary: No rash or pruritis. · Neurological: No headache, diplopia, change in muscle strength, numbness or tingling. No change in gait, balance, coordination, mood, affect, memory, mentation, behavior. · Psychiatric: No anxiety, no depression. · Endocrine: No malaise, fatigue or temperature intolerance. No excessive thirst, fluid intake, or urination. No tremor. · Hematologic/Lymphatic: No abnormal bruising or bleeding, blood clots or swollen lymph nodes. · Allergic/Immunologic: No nasal congestion or hives.     Physical Examination:    Vitals:    03/14/22 1509   BP: 110/72 Pulse: 81   SpO2: 95%        Constitutional and General Appearance: NAD   Respiratory:  · Normal excursion and expansion without use of accessory muscles  · Resp Auscultation: Normal breath sounds without dullness  Cardiovascular:  · The apical impulses not displaced  · Heart tones are crisp and normal  · Cervical veins are not engorged  · The carotid upstroke is normal in amplitude and contour without delay or bruit  · Normal S1S2, No S3, No Murmur  · Peripheral pulses are symmetrical and full  · There is no clubbing, cyanosis of the extremities. · No edema  · Femoral Arteries: 2+ and equal  · Pedal Pulses: 2+ and equal   Abdomen:  · No masses or tenderness  · Liver/Spleen: No Abnormalities Noted  Neurological/Psychiatric:  · Alert and oriented in all spheres  · Moves all extremities well  · Exhibits normal gait balance and coordination  · No abnormalities of mood, affect, memory, mentation, or behavior are noted    Stress echo 11/2015  Baseline echocardiogram shows normal LV function with an ejection fraction   of 55% with no wall motion abnormalities. Following exercise there was   uniform augmentation of all segments with appropriate hyperdynamic response   to exercise. ASSESSMENT:  CARPENTER - possible cardiac  S/P AVR in 2005- stable   Chronic AC - coumadin, managing at home, INR consistently 2.5 - 3.5. stable  Right BBB  Hypertension - stable   PVC's   Abnormal EKG  COVID positive 10/2020  Family history of heart diease   Self catheterization bladder several times a day   Diabetes mellitus   Cough - believe due to chronic sinus drainage. Consider ACE  Family history of heart diease   History of hematoma      Plan:   CT calcium score   If CT calcium score comes back at 0 or low will just ordered a echocardiogram. If score is elevated then will ordered a stress echocardiogram   Stop Ramipril for one month and see if cough resolves.  If you do not see a change in cough after being off medication for a month then restart it. If improves, will try ARB. Call the office and let us know. Cardiac medications reviewed including indications and pertinent side effects    Check blood pressure and heart rate at home a few times per week- keep a log with dates and times and bring to office visit   Regular exercise and following a healthy diet encouraged   Follow up with me in 1 year   Will call PCP office to obtain lab work results     Scribe's attestation: This note was scribed in the presence of Dr. Nasra Salmon M.D. By Grace Guerra RN    The scribes documentation has been prepared under my direction and personally reviewed by me in its entirety. I confirm that the note above accurately reflects all work, treatment, procedures, and medical decision making performed by me. Dr. Nasra Salmon MD    Thank you for allowing me to participate in the care of this individual.      Louise Storey.  Patricio Lopez M.D., [place]

## 2024-02-09 NOTE — PROGRESS NOTES
[facility]   Cardiac Followup    Referring Provider:  Susi Munoz     Chief Complaint   Patient presents with    Follow-up    Hypertension    Other     HX of AVR      Reji Frazier   1965      History of Present Illness:    Reji Frazier is a 58 y.o. male who is here today for follow up for a past medical history of hypertension, S/p AVR in 2005, Right BBB, diabetes, and PVC's. Patient had a normal stress echocardiogram 2015, EF 55%.    Today he states he continues to have a cough off and on despite stopping ACE. He states his cough has been going on for years and seems to clear when he drinks a hot beverage. Cough can be productive at times and does not seem to be related to certain times of the year. He is tolerating his medications and is taking them as prescribed. PCP ordered renal US due to abnormal labs. States he was told his spleen is enlarged. He has slightly elevated liver enzymes and will be having a liver US. Recent LFT's came back normal. He has a family history of non alcoholic cirrhosis. He just had an EKG through his PCP. He rides a stationary bike for around 30 minutes and feels well. Only limiting factor is his knee pain. Patient currently denies any weight gain, edema, palpitations, chest pain, shortness of breath, dizziness, and syncope.  He has a history of a neurogenic bladder and has to self cath, (on coumadin).  He occasionally has blood in his urine and also in the catheter after he removes it. He is on low dose antibiotics due to frequent UTI's. His A1C is now 7 after starting Mounjaro.       Past Medical History:   has a past medical history of Aortic valve disease, Diabetes (HCC), Hyperlipidemia, Hypertension, and Neurogenic bladder.    Surgical History:   has a past surgical history that includes Aortic valve replacement.     Social History:   reports that he has never smoked. He has never used smokeless tobacco. He reports current alcohol use. He reports that he

## 2024-02-12 ENCOUNTER — OFFICE VISIT (OUTPATIENT)
Dept: CARDIOLOGY CLINIC | Age: 59
End: 2024-02-12
Payer: COMMERCIAL

## 2024-02-12 VITALS
OXYGEN SATURATION: 97 % | HEIGHT: 73 IN | BODY MASS INDEX: 36.18 KG/M2 | HEART RATE: 78 BPM | DIASTOLIC BLOOD PRESSURE: 66 MMHG | SYSTOLIC BLOOD PRESSURE: 130 MMHG | WEIGHT: 273 LBS

## 2024-02-12 DIAGNOSIS — I49.3 PVC'S (PREMATURE VENTRICULAR CONTRACTIONS): Primary | ICD-10-CM

## 2024-02-12 DIAGNOSIS — I10 ESSENTIAL HYPERTENSION: ICD-10-CM

## 2024-02-12 DIAGNOSIS — Z95.2 S/P AVR (AORTIC VALVE REPLACEMENT): ICD-10-CM

## 2024-02-12 PROCEDURE — 99214 OFFICE O/P EST MOD 30 MIN: CPT | Performed by: INTERNAL MEDICINE

## 2024-02-12 PROCEDURE — 3017F COLORECTAL CA SCREEN DOC REV: CPT | Performed by: INTERNAL MEDICINE

## 2024-02-12 PROCEDURE — 1036F TOBACCO NON-USER: CPT | Performed by: INTERNAL MEDICINE

## 2024-02-12 PROCEDURE — 3075F SYST BP GE 130 - 139MM HG: CPT | Performed by: INTERNAL MEDICINE

## 2024-02-12 PROCEDURE — G8417 CALC BMI ABV UP PARAM F/U: HCPCS | Performed by: INTERNAL MEDICINE

## 2024-02-12 PROCEDURE — 3078F DIAST BP <80 MM HG: CPT | Performed by: INTERNAL MEDICINE

## 2024-02-12 PROCEDURE — G8484 FLU IMMUNIZE NO ADMIN: HCPCS | Performed by: INTERNAL MEDICINE

## 2024-02-12 PROCEDURE — 93000 ELECTROCARDIOGRAM COMPLETE: CPT | Performed by: INTERNAL MEDICINE

## 2024-02-12 PROCEDURE — G8427 DOCREV CUR MEDS BY ELIG CLIN: HCPCS | Performed by: INTERNAL MEDICINE

## 2024-02-12 RX ORDER — TIRZEPATIDE 5 MG/.5ML
INJECTION, SOLUTION SUBCUTANEOUS
COMMUNITY
Start: 2024-02-11

## 2024-02-12 RX ORDER — INSULIN LISPRO 100 [IU]/ML
INJECTION, SOLUTION INTRAVENOUS; SUBCUTANEOUS
COMMUNITY
Start: 2024-01-13

## 2024-02-12 RX ORDER — NITROFURANTOIN MACROCRYSTALS 100 MG/1
100 CAPSULE ORAL DAILY
COMMUNITY
Start: 2024-01-08

## 2024-02-12 RX ORDER — CHOLECALCIFEROL (VITAMIN D3) 1250 MCG
CAPSULE ORAL
COMMUNITY

## 2024-02-12 RX ORDER — EMPAGLIFLOZIN 10 MG/1
TABLET, FILM COATED ORAL
COMMUNITY
Start: 2024-02-08

## 2024-02-12 RX ORDER — TADALAFIL 5 MG/1
5 TABLET ORAL PRN
COMMUNITY

## 2024-02-12 RX ORDER — LOSARTAN POTASSIUM 25 MG/1
TABLET ORAL
COMMUNITY
Start: 2024-02-08

## 2024-02-12 NOTE — PATIENT INSTRUCTIONS
Plan:   ~Recommend seeing ENT for persistent cough   ~We called to have EKG and labs through your PCP faxed to us   ~INR goal is 2-2.5  Cardiac medications reviewed including indications and pertinent side effects. Medication list updated at this visit.   Check blood pressure and heart rate at home a few times per week- keep a log with dates and times and bring to office visit   Regular exercise and following a healthy diet encouraged   Follow up with me in 1 year

## 2024-05-03 ENCOUNTER — TELEPHONE (OUTPATIENT)
Dept: CARDIOLOGY CLINIC | Age: 59
End: 2024-05-03

## 2024-05-03 NOTE — TELEPHONE ENCOUNTER
Chart reviewed.  Mechanical aortic valve replacement 2005.  No op note available for review-type of valve unknown.  If the patient has a wallet card and can elaborate on details of this we should place this in the chart.  Otherwise, with mechanical valve and 5-day hold recommended for EGD, I would recommend therapeutic Lovenox bridge at 1 mg per kilogram twice daily dosing through the Coumadin clinic, with last dose evening prior to his procedure.  May then resume Coumadin post procedure, and bridge to therapeutic INR of 2-2.5.

## 2024-05-03 NOTE — TELEPHONE ENCOUNTER
Cardiac clearance request from [facility]-Dr. Lord     Date of procedure TBD  Procedure EGD  Requesting to hold Warfarin 5 days    LOV 2/12/24  EKG 12/12/23     [phone]      [facility]

## 2024-05-06 NOTE — TELEPHONE ENCOUNTER
Called patient and left VM to ask who is managing his INR. Does not look like we are or the coumadin clinic is.

## 2024-05-08 NOTE — TELEPHONE ENCOUNTER
Pt returned call, pt states his wife is a nurse and they purchased their own machine to test INR. Pt states he is debating on going through w/ EGD/biopsy. Pt will call when [facility] is back in office.

## 2024-05-17 NOTE — TELEPHONE ENCOUNTER
11/18/2020 [facility]
Detail Level: Generalized
Detail Level: Zone
Detail Level: Detailed

## 2025-02-18 ENCOUNTER — TELEPHONE (OUTPATIENT)
Dept: CARDIOLOGY CLINIC | Age: 60
End: 2025-02-18

## 2025-02-18 NOTE — TELEPHONE ENCOUNTER
CARDIAC CLEARANCE REQUEST    What type of procedure are you having:  Shoulder replacement  Are you taking any blood thinners:  Warfarin  Type on anesthesia:  Pt does know  When is your procedure scheduled for:  3.20.2025  What physician is performing your procedure:  Dr Cao  Phone Number:  [phone]  Fax number to send the letter:   [phone]    Last ov: 2.12.2024  Next ov: None. Next available is April 2. Can pt have overbook date and time. Please advise    Call back: [phone]

## 2025-02-19 NOTE — TELEPHONE ENCOUNTER
Ok for surgery  Does not need OV  OK hold warfarin. Defer duration to surgeon  If needs held more than 3 days, will need lovenox bridge - warfarin clinic can manage

## 2025-03-12 LAB
ANION GAP SERPL CALCULATED.3IONS-SCNC: 7 MMOL/L (ref 5–13)
APTT: 48.7 SECONDS (ref 23.1–37.6)
BACTERIA: ABNORMAL /HPF
BILIRUBIN, URINE: NEGATIVE
BLOOD, URINE: ABNORMAL
BUN / CREAT RATIO: 15
BUN BLDV-MCNC: 15 MG/DL (ref 7–25)
CALCIUM SERPL-MCNC: 9.5 MG/DL (ref 8.5–10.5)
CHLORIDE BLD-SCNC: 105 MMOL/L (ref 98–110)
CLARITY, UA: ABNORMAL
CO2: 26 MMOL/L (ref 22–29)
COAGULATION TISSUE FACTOR INDUCED BLD TIME PT. COAG: 28.4 SECONDS (ref 10.5–14.1)
COLOR, UA: YELLOW
CREAT SERPL-MCNC: 0.98 MG/DL (ref 0.5–1.3)
EGFR (CKD-EPI): 88 SEE NOTE
GLUCOSE BLD-MCNC: 161 MG/DL (ref 71–99)
GLUCOSE URINE: >1000 MG/DL
HCT VFR BLD CALC: 42.5 % (ref 40–51)
HEMOGLOBIN: 14 G/DL (ref 13.2–17.1)
INR BLD: 2.4 (ref 0.9–1.1)
KETONES, URINE: NEGATIVE MG/DL
LEUKOCYTE ESTERASE, URINE: ABNORMAL
LEUKOCYTES, BLD: 6.73 10*3/UL (ref 4–12)
LEUKOCYTES, UA: >100 /HPF (ref 0–5)
MCH RBC QN AUTO: 27.4 PG (ref 27–33)
MCHC RBC AUTO-ENTMCNC: 32.9 G/DL (ref 30–36)
MCV RBC AUTO: 83.2 FL (ref 80–100)
NITRITE, URINE: NEGATIVE
PDW BLD-RTO: 13.9 % (ref 11–15)
PH, URINE: 6 (ref 5–8)
PLATELET # BLD: 274 10*3/UL (ref 140–400)
PMV BLD AUTO: 10.6 FL (ref 9–13)
POTASSIUM SERPL-SCNC: 3.8 MMOL/L (ref 3.5–5.1)
PROTEIN UA: 70 MG/DL
RBC # BLD: 5.11 10*6/UL (ref 4.2–5.8)
RBC UA: >100 /HPF (ref 0–3)
SODIUM BLD-SCNC: 138 MMOL/L (ref 135–146)
SPECIFIC GRAVITY UA: 1.02 (ref 1–1.03)
UROBILINOGEN, URINE: <2 MG/DL

## 2025-03-24 DIAGNOSIS — Z95.2 S/P AVR (AORTIC VALVE REPLACEMENT): Primary | ICD-10-CM

## 2025-03-24 NOTE — TELEPHONE ENCOUNTER
Roland with Dr. Blanc's office (pts pcp) is requesting to speak with [facility] reguarding pts most recent INR. Pt was recently on Lovenox due to shoulder surgery. Pt started back on Warfrain and his INR on 3/24/25 was 1.5. Dr. Blanc is requesting to speak with [facility]. Phone number is [phone]. Please advise.

## 2025-03-26 RX ORDER — ENOXAPARIN SODIUM 150 MG/ML
INJECTION SUBCUTANEOUS
Qty: 6 EACH | Refills: 1 | Status: SHIPPED | OUTPATIENT
Start: 2025-03-26

## 2025-03-26 NOTE — TELEPHONE ENCOUNTER
Received a call from Roland at Dr. Munoz's office. Pt.'s INR today was 1.9. Given recent INR they are asking for a refill of lovenox. Lovenox pending sign off. Please review

## 2025-03-28 ENCOUNTER — TELEPHONE (OUTPATIENT)
Dept: CARDIOLOGY CLINIC | Age: 60
End: 2025-03-28

## 2025-03-28 NOTE — TELEPHONE ENCOUNTER
SHANAEI: Dr. Munoz's office called to give message to [facility]. Pts INR today was 2.3, Goal was 2.0. Dr. Antunez told pt to continue Coumadin 5mg and discontinue Lovenox injection.

## 2025-04-03 ENCOUNTER — TELEPHONE (OUTPATIENT)
Dept: CARDIOLOGY CLINIC | Age: 60
End: 2025-04-03